# Patient Record
Sex: FEMALE | Race: BLACK OR AFRICAN AMERICAN
[De-identification: names, ages, dates, MRNs, and addresses within clinical notes are randomized per-mention and may not be internally consistent; named-entity substitution may affect disease eponyms.]

---
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
## 2024-07-01 ENCOUNTER — HOSPITAL ENCOUNTER (EMERGENCY)
Dept: HOSPITAL 83 - ED | Age: 19
Discharge: HOME | End: 2024-07-01
Payer: COMMERCIAL

## 2024-07-01 VITALS — BODY MASS INDEX: 28.14 KG/M2 | HEIGHT: 68.98 IN | WEIGHT: 190 LBS

## 2024-07-01 DIAGNOSIS — J06.9: Primary | ICD-10-CM

## 2024-07-01 DIAGNOSIS — H92.09: ICD-10-CM

## 2024-07-01 DIAGNOSIS — Z79.2: ICD-10-CM

## 2024-07-01 DIAGNOSIS — Z20.822: ICD-10-CM

## 2025-04-29 ENCOUNTER — HOSPITAL ENCOUNTER (OUTPATIENT)
Dept: HOSPITAL 83 - LAB | Age: 20
Discharge: HOME | End: 2025-04-29
Attending: NURSE PRACTITIONER
Payer: COMMERCIAL

## 2025-04-29 DIAGNOSIS — R30.0: Primary | ICD-10-CM
